# Patient Record
Sex: FEMALE | Race: WHITE | NOT HISPANIC OR LATINO | URBAN - METROPOLITAN AREA
[De-identification: names, ages, dates, MRNs, and addresses within clinical notes are randomized per-mention and may not be internally consistent; named-entity substitution may affect disease eponyms.]

---

## 2017-05-08 PROBLEM — Z00.00 ENCOUNTER FOR PREVENTIVE HEALTH EXAMINATION: Status: ACTIVE | Noted: 2017-05-08

## 2019-06-29 ENCOUNTER — INPATIENT (INPATIENT)
Facility: HOSPITAL | Age: 74
LOS: 0 days | Discharge: HOME | DRG: 301 | End: 2019-06-29
Attending: INTERNAL MEDICINE | Admitting: INTERNAL MEDICINE
Payer: MEDICARE

## 2019-06-29 ENCOUNTER — TRANSCRIPTION ENCOUNTER (OUTPATIENT)
Age: 74
End: 2019-06-29

## 2019-06-29 VITALS
DIASTOLIC BLOOD PRESSURE: 68 MMHG | OXYGEN SATURATION: 95 % | SYSTOLIC BLOOD PRESSURE: 119 MMHG | HEART RATE: 75 BPM | TEMPERATURE: 98 F | RESPIRATION RATE: 16 BRPM

## 2019-06-29 VITALS
TEMPERATURE: 97 F | HEIGHT: 62 IN | OXYGEN SATURATION: 98 % | HEART RATE: 79 BPM | RESPIRATION RATE: 16 BRPM | WEIGHT: 186.07 LBS | SYSTOLIC BLOOD PRESSURE: 145 MMHG | DIASTOLIC BLOOD PRESSURE: 90 MMHG

## 2019-06-29 DIAGNOSIS — E78.5 HYPERLIPIDEMIA, UNSPECIFIED: ICD-10-CM

## 2019-06-29 DIAGNOSIS — Z29.9 ENCOUNTER FOR PROPHYLACTIC MEASURES, UNSPECIFIED: ICD-10-CM

## 2019-06-29 DIAGNOSIS — I82.412 ACUTE EMBOLISM AND THROMBOSIS OF LEFT FEMORAL VEIN: ICD-10-CM

## 2019-06-29 DIAGNOSIS — E11.9 TYPE 2 DIABETES MELLITUS WITHOUT COMPLICATIONS: ICD-10-CM

## 2019-06-29 DIAGNOSIS — Z90.710 ACQUIRED ABSENCE OF BOTH CERVIX AND UTERUS: Chronic | ICD-10-CM

## 2019-06-29 DIAGNOSIS — I10 ESSENTIAL (PRIMARY) HYPERTENSION: ICD-10-CM

## 2019-06-29 LAB
ALBUMIN SERPL ELPH-MCNC: 4.2 G/DL — SIGNIFICANT CHANGE UP (ref 3.5–5)
ALP SERPL-CCNC: 139 U/L — HIGH (ref 40–120)
ALT FLD-CCNC: 19 U/L DA — SIGNIFICANT CHANGE UP (ref 10–60)
ANION GAP SERPL CALC-SCNC: 6 MMOL/L — SIGNIFICANT CHANGE UP (ref 5–17)
APTT BLD: 35.7 SEC — SIGNIFICANT CHANGE UP (ref 27.5–36.3)
AST SERPL-CCNC: 20 U/L — SIGNIFICANT CHANGE UP (ref 10–40)
BASOPHILS # BLD AUTO: 0.06 K/UL — SIGNIFICANT CHANGE UP (ref 0–0.2)
BASOPHILS NFR BLD AUTO: 0.6 % — SIGNIFICANT CHANGE UP (ref 0–2)
BILIRUB SERPL-MCNC: 0.4 MG/DL — SIGNIFICANT CHANGE UP (ref 0.2–1.2)
BUN SERPL-MCNC: 19 MG/DL — HIGH (ref 7–18)
CALCIUM SERPL-MCNC: 9.1 MG/DL — SIGNIFICANT CHANGE UP (ref 8.4–10.5)
CHLORIDE SERPL-SCNC: 106 MMOL/L — SIGNIFICANT CHANGE UP (ref 96–108)
CO2 SERPL-SCNC: 28 MMOL/L — SIGNIFICANT CHANGE UP (ref 22–31)
CREAT SERPL-MCNC: 0.86 MG/DL — SIGNIFICANT CHANGE UP (ref 0.5–1.3)
EOSINOPHIL # BLD AUTO: 0.11 K/UL — SIGNIFICANT CHANGE UP (ref 0–0.5)
EOSINOPHIL NFR BLD AUTO: 1.1 % — SIGNIFICANT CHANGE UP (ref 0–6)
GLUCOSE BLDC GLUCOMTR-MCNC: 120 MG/DL — HIGH (ref 70–99)
GLUCOSE SERPL-MCNC: 95 MG/DL — SIGNIFICANT CHANGE UP (ref 70–99)
HBA1C BLD-MCNC: 5.9 % — HIGH (ref 4–5.6)
HCT VFR BLD CALC: 42.5 % — SIGNIFICANT CHANGE UP (ref 34.5–45)
HGB BLD-MCNC: 13.5 G/DL — SIGNIFICANT CHANGE UP (ref 11.5–15.5)
IMM GRANULOCYTES NFR BLD AUTO: 0.4 % — SIGNIFICANT CHANGE UP (ref 0–1.5)
INR BLD: 1.02 RATIO — SIGNIFICANT CHANGE UP (ref 0.88–1.16)
LYMPHOCYTES # BLD AUTO: 2.29 K/UL — SIGNIFICANT CHANGE UP (ref 1–3.3)
LYMPHOCYTES # BLD AUTO: 23 % — SIGNIFICANT CHANGE UP (ref 13–44)
MCHC RBC-ENTMCNC: 28.4 PG — SIGNIFICANT CHANGE UP (ref 27–34)
MCHC RBC-ENTMCNC: 31.8 GM/DL — LOW (ref 32–36)
MCV RBC AUTO: 89.5 FL — SIGNIFICANT CHANGE UP (ref 80–100)
MONOCYTES # BLD AUTO: 0.72 K/UL — SIGNIFICANT CHANGE UP (ref 0–0.9)
MONOCYTES NFR BLD AUTO: 7.2 % — SIGNIFICANT CHANGE UP (ref 2–14)
NEUTROPHILS # BLD AUTO: 6.74 K/UL — SIGNIFICANT CHANGE UP (ref 1.8–7.4)
NEUTROPHILS NFR BLD AUTO: 67.7 % — SIGNIFICANT CHANGE UP (ref 43–77)
NRBC # BLD: 0 /100 WBCS — SIGNIFICANT CHANGE UP (ref 0–0)
PLATELET # BLD AUTO: 229 K/UL — SIGNIFICANT CHANGE UP (ref 150–400)
POTASSIUM SERPL-MCNC: 4.1 MMOL/L — SIGNIFICANT CHANGE UP (ref 3.5–5.3)
POTASSIUM SERPL-SCNC: 4.1 MMOL/L — SIGNIFICANT CHANGE UP (ref 3.5–5.3)
PROT SERPL-MCNC: 8.3 G/DL — SIGNIFICANT CHANGE UP (ref 6–8.3)
PROTHROM AB SERPL-ACNC: 11.3 SEC — SIGNIFICANT CHANGE UP (ref 10–12.9)
RBC # BLD: 4.75 M/UL — SIGNIFICANT CHANGE UP (ref 3.8–5.2)
RBC # FLD: 12.8 % — SIGNIFICANT CHANGE UP (ref 10.3–14.5)
SODIUM SERPL-SCNC: 140 MMOL/L — SIGNIFICANT CHANGE UP (ref 135–145)
WBC # BLD: 9.96 K/UL — SIGNIFICANT CHANGE UP (ref 3.8–10.5)
WBC # FLD AUTO: 9.96 K/UL — SIGNIFICANT CHANGE UP (ref 3.8–10.5)

## 2019-06-29 PROCEDURE — 85027 COMPLETE CBC AUTOMATED: CPT

## 2019-06-29 PROCEDURE — 93005 ELECTROCARDIOGRAM TRACING: CPT

## 2019-06-29 PROCEDURE — G0378: CPT

## 2019-06-29 PROCEDURE — 83036 HEMOGLOBIN GLYCOSYLATED A1C: CPT

## 2019-06-29 PROCEDURE — 36415 COLL VENOUS BLD VENIPUNCTURE: CPT

## 2019-06-29 PROCEDURE — 99285 EMERGENCY DEPT VISIT HI MDM: CPT

## 2019-06-29 PROCEDURE — 85730 THROMBOPLASTIN TIME PARTIAL: CPT

## 2019-06-29 PROCEDURE — 85610 PROTHROMBIN TIME: CPT

## 2019-06-29 PROCEDURE — 99285 EMERGENCY DEPT VISIT HI MDM: CPT | Mod: 25

## 2019-06-29 PROCEDURE — 82962 GLUCOSE BLOOD TEST: CPT

## 2019-06-29 PROCEDURE — 80053 COMPREHEN METABOLIC PANEL: CPT

## 2019-06-29 RX ORDER — APIXABAN 2.5 MG/1
2 TABLET, FILM COATED ORAL
Qty: 20 | Refills: 0
Start: 2019-06-29 | End: 2019-07-03

## 2019-06-29 RX ORDER — ENOXAPARIN SODIUM 100 MG/ML
80 INJECTION SUBCUTANEOUS ONCE
Refills: 0 | Status: COMPLETED | OUTPATIENT
Start: 2019-06-29 | End: 2019-06-29

## 2019-06-29 RX ORDER — CLONAZEPAM 1 MG
0.5 TABLET ORAL THREE TIMES A DAY
Refills: 0 | Status: DISCONTINUED | OUTPATIENT
Start: 2019-06-29 | End: 2019-06-29

## 2019-06-29 RX ORDER — DEXTROSE 50 % IN WATER 50 %
25 SYRINGE (ML) INTRAVENOUS ONCE
Refills: 0 | Status: DISCONTINUED | OUTPATIENT
Start: 2019-06-29 | End: 2019-06-29

## 2019-06-29 RX ORDER — ATORVASTATIN CALCIUM 80 MG/1
20 TABLET, FILM COATED ORAL AT BEDTIME
Refills: 0 | Status: DISCONTINUED | OUTPATIENT
Start: 2019-06-29 | End: 2019-06-29

## 2019-06-29 RX ORDER — ARIPIPRAZOLE 15 MG/1
2 TABLET ORAL DAILY
Refills: 0 | Status: DISCONTINUED | OUTPATIENT
Start: 2019-06-29 | End: 2019-06-29

## 2019-06-29 RX ORDER — AMLODIPINE BESYLATE 2.5 MG/1
5 TABLET ORAL DAILY
Refills: 0 | Status: DISCONTINUED | OUTPATIENT
Start: 2019-06-29 | End: 2019-06-29

## 2019-06-29 RX ORDER — INSULIN LISPRO 100/ML
VIAL (ML) SUBCUTANEOUS AT BEDTIME
Refills: 0 | Status: DISCONTINUED | OUTPATIENT
Start: 2019-06-29 | End: 2019-06-29

## 2019-06-29 RX ORDER — ENOXAPARIN SODIUM 100 MG/ML
80 INJECTION SUBCUTANEOUS EVERY 12 HOURS
Refills: 0 | Status: DISCONTINUED | OUTPATIENT
Start: 2019-06-29 | End: 2019-06-29

## 2019-06-29 RX ORDER — LOSARTAN POTASSIUM 100 MG/1
50 TABLET, FILM COATED ORAL DAILY
Refills: 0 | Status: DISCONTINUED | OUTPATIENT
Start: 2019-06-29 | End: 2019-06-29

## 2019-06-29 RX ORDER — DEXTROSE 50 % IN WATER 50 %
15 SYRINGE (ML) INTRAVENOUS ONCE
Refills: 0 | Status: DISCONTINUED | OUTPATIENT
Start: 2019-06-29 | End: 2019-06-29

## 2019-06-29 RX ORDER — DEXTROSE 50 % IN WATER 50 %
12.5 SYRINGE (ML) INTRAVENOUS ONCE
Refills: 0 | Status: DISCONTINUED | OUTPATIENT
Start: 2019-06-29 | End: 2019-06-29

## 2019-06-29 RX ORDER — INSULIN LISPRO 100/ML
VIAL (ML) SUBCUTANEOUS
Refills: 0 | Status: DISCONTINUED | OUTPATIENT
Start: 2019-06-29 | End: 2019-06-29

## 2019-06-29 RX ORDER — GLUCAGON INJECTION, SOLUTION 0.5 MG/.1ML
1 INJECTION, SOLUTION SUBCUTANEOUS ONCE
Refills: 0 | Status: DISCONTINUED | OUTPATIENT
Start: 2019-06-29 | End: 2019-06-29

## 2019-06-29 RX ORDER — SODIUM CHLORIDE 9 MG/ML
1000 INJECTION, SOLUTION INTRAVENOUS
Refills: 0 | Status: DISCONTINUED | OUTPATIENT
Start: 2019-06-29 | End: 2019-06-29

## 2019-06-29 RX ORDER — APIXABAN 2.5 MG/1
1 TABLET, FILM COATED ORAL
Qty: 180 | Refills: 0
Start: 2019-06-29 | End: 2019-09-26

## 2019-06-29 RX ADMIN — ENOXAPARIN SODIUM 80 MILLIGRAM(S): 100 INJECTION SUBCUTANEOUS at 16:35

## 2019-06-29 NOTE — DISCHARGE NOTE NURSING/CASE MANAGEMENT/SOCIAL WORK - NSDCDPATPORTLINK_GEN_ALL_CORE
You can access the [facility] Patient Portal, offered by [facility], by registering with the following website: [email or web address]

## 2019-06-29 NOTE — ED ADULT NURSE NOTE - NSIMPLEMENTINTERV_GEN_ALL_ED
Implemented All Fall Risk Interventions:  [place] to call system. Call bell, personal items and telephone within reach. Instruct patient to call for assistance. Room bathroom lighting operational. Non-slip footwear when patient is off stretcher. Physically safe environment: no spills, clutter or unnecessary equipment. Stretcher in lowest position, wheels locked, appropriate side rails in place. Provide visual cue, wrist band, yellow gown, etc. Monitor gait and stability. Monitor for mental status changes and reorient to person, place, and time. Review medications for side effects contributing to fall risk. Reinforce activity limits and safety measures with patient and family.

## 2019-06-29 NOTE — DISCHARGE NOTE PROVIDER - NSDCCPCAREPLAN_GEN_ALL_CORE_FT
PRINCIPAL DISCHARGE DIAGNOSIS  Diagnosis: Acute deep vein thrombosis (DVT) of femoral vein of left lower extremity  Assessment and Plan of Treatment: you were found to have a left femoral DVT on outpatient imaging and were treated with lovenox 80mg ordered as every 12 hours; you were advised to stay for further workup for provoked DVT due to fall, but chose to sign out Against Medical Advice; you are advised to take eliquis as directed 10mg twice a day for 7 days and then 5mg twice a day for 3 months and to follow up with your Primary Care Physician within 3 days      SECONDARY DISCHARGE DIAGNOSES  Diagnosis: Hypertension  Assessment and Plan of Treatment: continue BP meds as directed, follow up with PCP within 1 week PRINCIPAL DISCHARGE DIAGNOSIS  Diagnosis: Acute deep vein thrombosis (DVT) of femoral vein of left lower extremity  Assessment and Plan of Treatment: you were found to have a left femoral DVT on outpatient imaging and were treated with lovenox 80mg ordered as every 12 hours; you were treated for a provoked DVT due to fall; you are advised to take eliquis as directed 10mg twice a day for 7 days and then 5mg twice a day for 3 months and to follow up with your Primary Care Physician within 3 days      SECONDARY DISCHARGE DIAGNOSES  Diagnosis: Hypertension  Assessment and Plan of Treatment: continue BP meds as directed, follow up with PCP within 1 week

## 2019-06-29 NOTE — DISCHARGE NOTE PROVIDER - HOSPITAL COURSE
Patient is a 74F from home with PMH HTN and remote Hx DVT 16 years ago, not on anticoagulation, presenting to the ED with left leg pain and swelling, s/p fall several weeks ago. Outpatient imaging was negative for fracture but positive for L femoral DVT. Patient was given 80mg lovenox x 1 and started on maintenance Tx, found to be hemodynamically stable. Patient advised on staying in the hospital for further work up but chose to sign out Against Medical Advice. Will be discharged on treatment dose of eliquis. Patient is a 74F from home with PMH HTN and remote Hx DVT 16 years ago, not on anticoagulation, presenting to the ED with left leg pain and swelling, s/p fall several weeks ago. Outpatient imaging was negative for fracture but positive for L femoral DVT. Patient was given 80mg lovenox x 1 and started on maintenance Tx, found to be hemodynamically stable. Stable for discharge on 7 days of eliquis 10mg BID and 3 months of eliquis 5mg BID. Advised to follow up with PCP within 1 week for provoked DVT.

## 2019-06-29 NOTE — ED PROVIDER NOTE - CLINICAL SUMMARY MEDICAL DECISION MAKING FREE TEXT BOX
patient presenting with left leg pain, + dvt on ultrasound today. will obtain lab, ekg, pt/inr. AG, admit given extensive dvt on ultrasound, report and disc with patient.

## 2019-06-29 NOTE — ED PROVIDER NOTE - OBJECTIVE STATEMENT
74 y.o w/ pmh of htn, remove history of dvt 16 years ago, no on blood thinner presenting with left leg pain and swelling preceeded by fall several weeks ago, negative outpatient xray for fracture. went to urgent care today, has ultrasound performed with report with patient showing left femoral dvt. patient denies cp, sob, n, v, abd pain, fever, chills or cough.

## 2019-06-29 NOTE — H&P ADULT - HISTORY OF PRESENT ILLNESS
74F from home, with PMHx of HTN, DM, HLD, DVT, anxiety presented to ED after [facility] and diagnosis of DVT. She states she [place] weeks ago in [place] and went to urgent care and had Xrays of facial bone done which showed no fracture. She also complaints of on and off lt. foot swelling since 2-3 weeks. She used warm compressions which helped relief swelling, but did not resolve totally. Last night, it was worsening so she went to [facility] and had lower extremity doppler which showed extensive DVT in mid and distal femoral, popliteal and post. tibial vein. Xray of foot shows no fracture. She also complaints of pain in legs 4/10, increase on walking. She is not bed bound, did not travel recently. She had DVT 16yrs ago after hysterectomy and was on coumadin for 4 yrs and was stopped as it resolved. Denies any chest pain, shortness of breath, fever, cough.

## 2019-06-29 NOTE — ED PROVIDER NOTE - PROGRESS NOTE DETAILS
discussed with dr mena, admitted for dvt found on outpatient US. vital stable. no signs of distress.

## 2019-06-29 NOTE — H&P ADULT - NSICDXPASTMEDICALHX_GEN_ALL_CORE_FT
PAST MEDICAL HISTORY:  Diabetes mellitus     DVT, lower extremity     HLD (hyperlipidemia)     HTN (hypertension)

## 2019-06-30 RX ORDER — AMLODIPINE BESYLATE 2.5 MG/1
1 TABLET ORAL
Qty: 0 | Refills: 0 | DISCHARGE

## 2019-06-30 RX ORDER — METFORMIN HYDROCHLORIDE 850 MG/1
1 TABLET ORAL
Qty: 0 | Refills: 0 | DISCHARGE

## 2019-06-30 RX ORDER — LOSARTAN POTASSIUM 100 MG/1
1 TABLET, FILM COATED ORAL
Qty: 0 | Refills: 0 | DISCHARGE

## 2019-06-30 RX ORDER — CLONAZEPAM 1 MG
1 TABLET ORAL
Qty: 0 | Refills: 0 | DISCHARGE

## 2019-06-30 RX ORDER — ARIPIPRAZOLE 15 MG/1
1 TABLET ORAL
Qty: 0 | Refills: 0 | DISCHARGE

## 2019-06-30 RX ORDER — ATORVASTATIN CALCIUM 80 MG/1
1 TABLET, FILM COATED ORAL
Qty: 0 | Refills: 0 | DISCHARGE

## 2019-06-30 RX ORDER — CLONAZEPAM 1 MG
0 TABLET ORAL
Qty: 0 | Refills: 0 | DISCHARGE

## 2025-01-16 NOTE — PATIENT PROFILE ADULT - PRIMARY ROLES/RESPONSIBILITIES
Speech-Language Pathology Visit    Visit Type: Daily Treatment Note  Visit: 4  Referring Provider: Xiomara Ramirez NP  Medical Diagnosis (from order): Diagnosis Information      Diagnosis    G93.89 (ICD-10-CM) - Brain mass    R47.9 (ICD-10-CM) - Speech disorder            Diagnosis precautions: PAYOR: MEDICARE; AUTHORIZATION NEEDED: NO, FOLLOW MEDICARE GUIDELINES   THRESHOLD AMOUNT: $2330   PT AND ST COMBINED MET:$2326.86           OT SEPARATE MET:$3170.87   IF CONTINUED SERVICES ARE REASONABLE AND NECESSARY BEYOND $2230, A KX MODIFIER WILL BE NEEDED.  If you want to attest that continued services are medically necessary, please add:  HOD = KX MODIFER AT VISIT 16, AMB = KX MODIFIER AT VISIT 22    Chart reviewed at time of initial evaluation (relevant co-morbidities, allergies, tests and medications listed): PMH includes approximately 10/31/24 resection of a L frontal brain extra-axial mass/ metastasis via a bicoronal incision and a left frontal craniotomy; had inpatient rehab on 11/4/2024 and has subsequently been discharged home; speech and cognition improved after surgery  ------------------  11 & 12/2024 Home ST 4 visits comm/cog  -------------------  11/13/2024 discharged home from [facility]  Per inpt ST notes  11/8/24: [facility]  11/7/24: WAB-R part 1; SCCAN initiated   11/6/24: WAB-R part 1 initiated; clinical swallow evaluation.   11/2/24: Com/cog eval, MOCA 8.1  10/31/24: Admit planned left frontal craniotomy for tumor resection. 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  She feels tired today, had PT prior to this session.  Was also tired yesterday and did not feel she could do a lot, took intermittent rests after doing household activities.  Did sleep good last night.  Needed to use restroom during this ST visit  Pain / Symptoms:  Location: Right leg and knee   - Pain rating (out of 10): ; Best: 5  Patient/Caregiver Goals: Be able to get back in the community and community groups, be around people with more confidence in her Communication/ Cognitive function.       OBJECTIVE                                                                                                                               Communication/Cognition  Behavior/Social Interaction:    - Intact (> 90% accuracy) except    Affect: seems to be lower in enery today (1/16)  Auditory Comprehension:      - Sentences: intact (>90% accuracy)    - Paragraphs: intact (>90% accuracy)    - Conversations - simple: intact (>90% accuracy)    - Conversations - complex: intact (>90% accuracy)  Problem Solving:      - LEAP therapy program therapeutic activities continue; LEAP activities addressed this session:  -Check of Returned HEP which were worksheets for Cognitive Stimulation Activities which were provided in area of Memory, levels 1, 2 & 3 (of 3 levels):  --Explain vs Describe how she would respond in given situations:  Fair but some responses lack detail.  Written language is well  --Remote Recall of which of two events came first in history:  Good recall of details in each of 10 scenarios; patient provided details in writing.  Remote recall and sentence and short paragraph writing judged WNL for this activity.    --Associating/ generating words to the given definitions: total of 20 words with prefixes of pro- and con:  patient found these to be a challenge but is able to show understanding of meaning and use of same words reviewed in tx session  --Recalling and writing several  sentences of details from the past- each example was related to her personal life:  patient wrote on 3 scenarios, 5 sentences per each with minor spelling errors x2 and minor omission of subject of sentence x2. Overall these writing samples were considered WNL with appropriate content and flow to her written material.    Patient summary of her above HEP work :  the exercises were good to do and she could see the rationale for each in terms of Communication/ Cognitive skills.               ASSESSMENT                                                                                                           Good patient participation in reading, listening, and conversation related to her returned and completed Communication/ Cognitive HEP materials.  Patient presents as being more subdued/ lethargic today than in prior tx sessions.  Continuation of  short period of OP SLP intervention related training and education regarding use of communication & cognitive strategies is indicated and patient agrees.     Education:   - Present and ready to learn: patient  - Results of above outlined education: Verbalizes understanding and Needs reinforcement    PLAN                                                                                                                           Suggestions for next session as indicated: 1-2 more visits by 2/14; consider repeat partial assessment and/ or outcome tools prior to discharge.  Follow-up re: possible benefit for rehab psychology/ behavioral health referral. Check and Advance HEP and carry-over activities, continue with LEAP as helpful.         Therapy procedure time and total treatment time can be found documented on the Time Entry flowsheet   retired